# Patient Record
Sex: FEMALE | Race: BLACK OR AFRICAN AMERICAN | NOT HISPANIC OR LATINO | URBAN - NONMETROPOLITAN AREA
[De-identification: names, ages, dates, MRNs, and addresses within clinical notes are randomized per-mention and may not be internally consistent; named-entity substitution may affect disease eponyms.]

---

## 2019-02-18 ENCOUNTER — IMPORTED ENCOUNTER (OUTPATIENT)
Dept: URBAN - NONMETROPOLITAN AREA CLINIC 1 | Facility: CLINIC | Age: 69
End: 2019-02-18

## 2019-02-18 PROBLEM — Z96.1: Noted: 2019-02-18

## 2019-02-18 PROBLEM — E11.9: Noted: 2019-02-18

## 2019-02-18 PROBLEM — H26.493: Noted: 2019-02-18

## 2019-02-18 PROCEDURE — 92012 INTRM OPH EXAM EST PATIENT: CPT

## 2019-02-18 NOTE — PATIENT DISCUSSION
Pseudophakia with mild PCO OUDiscussed diagnosis in detail with patient. Both intraocular lenses in place today. Mild PCO noted today; not visually significant. Continue to monitor. NIDDM sans retinopathy OUDiscussed diagnosis with patient. Discussed the risk of diabetic damage of the retina with potential vision loss and the importance of routine follow-up. Emphasized strict blood sugar control. Continue to monitor.; Dr's Notes: MR  deferred 11/19/18DFE 02/18/19[facility]

## 2020-10-05 ENCOUNTER — IMPORTED ENCOUNTER (OUTPATIENT)
Dept: URBAN - NONMETROPOLITAN AREA CLINIC 1 | Facility: CLINIC | Age: 70
End: 2020-10-05

## 2020-10-05 PROBLEM — E11.9: Noted: 2020-10-05

## 2020-10-05 PROBLEM — H52.4: Noted: 2020-10-05

## 2020-10-05 PROBLEM — H26.493: Noted: 2020-10-05

## 2020-10-05 PROBLEM — Z96.1: Noted: 2020-10-05

## 2020-10-05 PROCEDURE — 92340 FIT SPECTACLES MONOFOCAL: CPT

## 2020-10-05 PROCEDURE — S0621 ROUTINE OPHTHALMOLOGICAL EXA: HCPCS

## 2020-10-05 NOTE — PATIENT DISCUSSION
Presbyopia OUDiscussed refractive status in detail with patient. New glasses Rx given today. Continue to monitor. Pseudophakia with mild PCO OUDiscussed diagnosis in detail with patient. Both intraocular lenses in place today. Mild PCO noted today; not visually significant. Continue to monitor. NIDDM sans retinopathy OUDiscussed diagnosis with patient. Discussed the risk of diabetic damage of the retina with potential vision loss and the importance of routine follow-up. Emphasized strict blood sugar control. Continue to monitor. RTC in 1 year with [facility]; 's Notes: MR  10/5/20DFE  10/5/20[facility]

## 2022-04-09 ASSESSMENT — TONOMETRY
OS_IOP_MMHG: 18
OD_IOP_MMHG: 17
OD_IOP_MMHG: 17
OS_IOP_MMHG: 16

## 2022-04-09 ASSESSMENT — VISUAL ACUITY
OS_CC: 20/20-2 SLOW
OD_CC: 20/20 SLOW
OS_CC: 20/30+
OD_CC: 20/30

## 2023-06-22 ENCOUNTER — COMPREHENSIVE EXAM (OUTPATIENT)
Dept: URBAN - NONMETROPOLITAN AREA CLINIC 1 | Facility: CLINIC | Age: 73
End: 2023-06-22

## 2023-06-22 DIAGNOSIS — H52.4: ICD-10-CM

## 2023-06-22 PROCEDURE — 92015 DETERMINE REFRACTIVE STATE: CPT

## 2023-06-22 PROCEDURE — 92014 COMPRE OPH EXAM EST PT 1/>: CPT

## 2023-06-22 ASSESSMENT — TONOMETRY
OD_IOP_MMHG: 19
OS_IOP_MMHG: 17

## 2023-06-22 ASSESSMENT — VISUAL ACUITY
OS_CC: 20/22+
OD_CC: 20/20-